# Patient Record
Sex: MALE | Race: WHITE | Employment: FULL TIME | URBAN - METROPOLITAN AREA
[De-identification: names, ages, dates, MRNs, and addresses within clinical notes are randomized per-mention and may not be internally consistent; named-entity substitution may affect disease eponyms.]

---

## 2018-10-03 ENCOUNTER — HOSPITAL ENCOUNTER (OUTPATIENT)
Age: 58
Setting detail: OUTPATIENT SURGERY
Discharge: HOME | End: 2018-10-03
Attending: UROLOGY | Admitting: UROLOGY
Payer: COMMERCIAL

## 2018-10-03 VITALS
DIASTOLIC BLOOD PRESSURE: 81 MMHG | RESPIRATION RATE: 16 BRPM | SYSTOLIC BLOOD PRESSURE: 163 MMHG | HEART RATE: 78 BPM | WEIGHT: 238.19 LBS | BODY MASS INDEX: 32.26 KG/M2 | TEMPERATURE: 96.9 F | OXYGEN SATURATION: 100 % | HEIGHT: 72 IN

## 2018-10-03 DIAGNOSIS — N13.2 URETERAL STONE WITH HYDRONEPHROSIS: Primary | ICD-10-CM

## 2018-10-03 PROBLEM — N20.1 LEFT URETERAL STONE: Status: ACTIVE | Noted: 2018-10-03

## 2018-10-03 PROCEDURE — 76210000020 HC REC RM PH II FIRST 0.5 HR: Performed by: UROLOGY

## 2018-10-03 PROCEDURE — 74011250636 HC RX REV CODE- 250/636: Performed by: UROLOGY

## 2018-10-03 PROCEDURE — 74011250636 HC RX REV CODE- 250/636

## 2018-10-03 PROCEDURE — 50590 FRAGMENTING OF KIDNEY STONE: CPT | Performed by: UROLOGY

## 2018-10-03 PROCEDURE — 77030020782 HC GWN BAIR PAWS FLX 3M -B: Performed by: UROLOGY

## 2018-10-03 RX ORDER — DOCUSATE SODIUM 100 MG/1
100 CAPSULE, LIQUID FILLED ORAL 2 TIMES DAILY
Qty: 30 CAP | Refills: 0 | Status: SHIPPED | OUTPATIENT
Start: 2018-10-03 | End: 2019-01-01

## 2018-10-03 RX ORDER — OXYCODONE AND ACETAMINOPHEN 5; 325 MG/1; MG/1
1 TABLET ORAL
Qty: 15 TAB | Refills: 0 | Status: SHIPPED | OUTPATIENT
Start: 2018-10-03

## 2018-10-03 RX ORDER — CEFAZOLIN SODIUM/WATER 2 G/20 ML
2 SYRINGE (ML) INTRAVENOUS ONCE
Status: DISCONTINUED | OUTPATIENT
Start: 2018-10-03 | End: 2018-10-03 | Stop reason: SDUPTHER

## 2018-10-03 RX ORDER — NALOXONE HYDROCHLORIDE 1 MG/ML
1 INJECTION INTRAMUSCULAR; INTRAVENOUS; SUBCUTANEOUS AS NEEDED
Status: DISCONTINUED | OUTPATIENT
Start: 2018-10-03 | End: 2018-10-04 | Stop reason: HOSPADM

## 2018-10-03 RX ORDER — SODIUM CHLORIDE, SODIUM LACTATE, POTASSIUM CHLORIDE, CALCIUM CHLORIDE 600; 310; 30; 20 MG/100ML; MG/100ML; MG/100ML; MG/100ML
125 INJECTION, SOLUTION INTRAVENOUS CONTINUOUS
Status: DISCONTINUED | OUTPATIENT
Start: 2018-10-03 | End: 2018-10-04 | Stop reason: HOSPADM

## 2018-10-03 RX ORDER — MIDAZOLAM HYDROCHLORIDE 5 MG/ML
6 INJECTION INTRAMUSCULAR; INTRAVENOUS AS NEEDED
Status: DISCONTINUED | OUTPATIENT
Start: 2018-10-03 | End: 2018-10-04 | Stop reason: HOSPADM

## 2018-10-03 RX ORDER — FLUMAZENIL 0.1 MG/ML
0.5 INJECTION INTRAVENOUS AS NEEDED
Status: DISCONTINUED | OUTPATIENT
Start: 2018-10-03 | End: 2018-10-04 | Stop reason: HOSPADM

## 2018-10-03 RX ORDER — OXYCODONE AND ACETAMINOPHEN 5; 325 MG/1; MG/1
1 TABLET ORAL
Status: CANCELLED | OUTPATIENT
Start: 2018-10-03

## 2018-10-03 RX ORDER — SODIUM CHLORIDE, SODIUM LACTATE, POTASSIUM CHLORIDE, CALCIUM CHLORIDE 600; 310; 30; 20 MG/100ML; MG/100ML; MG/100ML; MG/100ML
125 INJECTION, SOLUTION INTRAVENOUS CONTINUOUS
Status: CANCELLED | OUTPATIENT
Start: 2018-10-03

## 2018-10-03 RX ORDER — CEFAZOLIN SODIUM 2 G/50ML
2 SOLUTION INTRAVENOUS ONCE
Status: COMPLETED | OUTPATIENT
Start: 2018-10-03 | End: 2018-10-03

## 2018-10-03 RX ORDER — OXYCODONE AND ACETAMINOPHEN 5; 325 MG/1; MG/1
2 TABLET ORAL
Status: CANCELLED | OUTPATIENT
Start: 2018-10-03

## 2018-10-03 RX ORDER — FENTANYL CITRATE 50 UG/ML
300 INJECTION, SOLUTION INTRAMUSCULAR; INTRAVENOUS AS NEEDED
Status: DISCONTINUED | OUTPATIENT
Start: 2018-10-03 | End: 2018-10-04 | Stop reason: HOSPADM

## 2018-10-03 RX ADMIN — CEFAZOLIN SODIUM 2 G: 2 SOLUTION INTRAVENOUS at 16:36

## 2018-10-03 RX ADMIN — MIDAZOLAM HYDROCHLORIDE 2 MG: 5 INJECTION INTRAMUSCULAR; INTRAVENOUS at 16:41

## 2018-10-03 RX ADMIN — SODIUM CHLORIDE, SODIUM LACTATE, POTASSIUM CHLORIDE, AND CALCIUM CHLORIDE 125 ML/HR: 600; 310; 30; 20 INJECTION, SOLUTION INTRAVENOUS at 14:04

## 2018-10-03 RX ADMIN — FENTANYL CITRATE 100 MCG: 50 INJECTION, SOLUTION INTRAMUSCULAR; INTRAVENOUS at 16:50

## 2018-10-03 RX ADMIN — FENTANYL CITRATE 100 MCG: 50 INJECTION, SOLUTION INTRAMUSCULAR; INTRAVENOUS at 16:41

## 2018-10-03 NOTE — IP AVS SNAPSHOT
[address] [address] [place] [phone] Patient: Bernadine Conley MRN: [id number] :1960 About your hospitalization You were admitted on:  October 3, 2018 You last received care in the:  [address] [place] [address] You were discharged on:  October 3, 2018 Why you were hospitalized Your primary diagnosis was:  Not on File Your diagnoses also included:  Left Ureteral Stone Follow-up Information Follow up With Details Comments Contact Info Rui Javed MD   [address] 
[phone] Discharge Orders None A check casey indicates which time of day the medication should be taken. My Medications START taking these medications Instructions Each Dose to Equal  
 Morning Noon Evening Bedtime  
 docusate sodium 100 mg capsule Commonly known as:  Mehnaz Guidry Your last dose was: Your next dose is: Take 1 Cap by mouth two (2) times a day for 90 days. 100 mg  
    
   
   
   
  
 oxyCODONE-acetaminophen 5-325 mg per tablet Commonly known as:  PERCOCET Your last dose was: Your next dose is: Take 1 Tab by mouth every four (4) hours as needed for Pain. Max Daily Amount: 6 Tabs. 1 Tab CONTINUE taking these medications Instructions Each Dose to Equal  
 Morning Noon Evening Bedtime  
 aspirin delayed-release 81 mg tablet Your last dose was: Your next dose is: Take 81 mg by mouth daily. 81 mg  
    
   
   
   
  
 CIPRO 500 mg tablet Generic drug:  ciprofloxacin HCl Your last dose was: Your next dose is: Take 500 mg by mouth every twelve (12) hours. 500 mg  
    
   
   
   
  
 DILANTIN EXTENDED 100 mg ER capsule Generic drug:  phenytoin ER Your last dose was: Your next dose is: Take 200 mg by mouth two (2) times a day. 200 mg HYDROcodone-acetaminophen 5-325 mg per tablet Commonly known as:  [place] Your last dose was: Your next dose is: Take 1 Tab by mouth every four (4) hours as needed for Pain. 1 Tab MOTRIN  mg tablet Generic drug:  ibuprofen Your last dose was: Your next dose is: Take 800 mg by mouth every six (6) hours as needed for Pain. 800 mg  
    
   
   
   
  
 simvastatin 20 mg tablet Commonly known as:  ZOCOR Your last dose was: Your next dose is: Take 20 mg by mouth nightly. 20 mg Where to Get Your Medications Information on where to get these meds will be given to you by the nurse or doctor. ! Ask your nurse or doctor about these medications  
  docusate sodium 100 mg capsule  
 oxyCODONE-acetaminophen 5-325 mg per tablet Opioid Education Prescription Opioids: What You Need to Know: 
 
Prescription opioids can be used to help relieve moderate-to-severe pain and are often prescribed following a surgery or injury, or for certain health conditions. These medications can be an important part of treatment but also come with serious risks. Opioids are strong pain medicines. Examples include hydrocodone, oxycodone, fentanyl, and morphine. Heroin is an example of an illegal opioid. It is important to work with your health care provider to make sure you are getting the safest, most effective care. WHAT ARE THE RISKS AND SIDE EFFECTS OF OPIOID USE? Prescription opioids carry serious risks of addiction and overdose, especially with prolonged use. An opioid overdose, often marked by slow breathing, can cause sudden death. The use of prescription opioids can have a number of side effects as well, even when taken as directed. · Tolerance-meaning you might need to take more of a medication for the same pain relief · Physical dependence-meaning you have symptoms of withdrawal when the medication is stopped. Withdrawal symptoms can include nausea, sweating, chills, diarrhea, stomach cramps, and muscle aches. Withdrawal can last up to several weeks, depending on which drug you took and how long you took it. · Increased sensitivity to pain · Constipation · Nausea, vomiting, and dry mouth · Sleepiness and dizziness · Confusion · Depression · Low levels of testosterone that can result in lower sex drive, energy, and strength · Itching and sweating RISKS ARE GREATER WITH:      
· History of drug misuse, substance use disorder, or overdose · Mental health conditions (such as depression or anxiety) · Sleep apnea · Older age (72 years or older) · Pregnancy Avoid alcohol while taking prescription opioids. Also, unless specifically advised by your health care provider, medications to avoid include: · Benzodiazepines (such as Xanax or Valium) · Muscle relaxants (such as Soma or Flexeril) · Hypnotics (such as Ambien or Lunesta) · Other prescription opioids KNOW YOUR OPTIONS Talk to your health care provider about ways to manage your pain that don't involve prescription opioids. Some of these options may actually work better and have fewer risks and side effects. Consult your physician before adding or stopping any medications, treatments, or physical activity. Options may include: 
· Pain relievers such as acetaminophen, ibuprofen, and naproxen · Some medications that are also used for depression or seizures · Physical therapy and exercise · Counseling to help patients learn how to cope better with triggers of pain and stress. · Application of heat or cold compress · Massage therapy · Relaxation techniques Be Informed Make sure you know the name of your medication, how much and how often to take it, and its potential risks & side effects. IF YOU ARE PRESCRIBED OPIOIDS FOR PAIN: 
· Never take opioids in greater amounts or more often than prescribed. Remember the goal is not to be pain-free but to manage your pain at a tolerable level. · Follow up with your primary care provider to: · Work together to create a plan on how to manage your pain. · Talk about ways to help manage your pain that don't involve prescription opioids. · Talk about any and all concerns and side effects. · Help prevent misuse and abuse. · Never sell or share prescription opioids · Help prevent misuse and abuse. · Store prescription opioids in a secure place and out of reach of others (this may include visitors, children, friends, and family). · Safely dispose of unused/unwanted prescription opioids: Find your community drug take-back program or your pharmacy mail-back program, or flush them down the toilet, following guidance from the Food and Drug Administration (www.fda.gov/Drugs/ResourcesForYou). · Visit www.cdc.gov/drugoverdose to learn about the risks of opioid abuse and overdose. · If you believe you may be struggling with addiction, tell your health care provider and ask for guidance or call [address] [place] [address] at [phone]. Discharge Instructions PROCEDURE Left ESWL 
__ Notify [facility]. Urology IMMEDIATELY if any of the following occur: ? You are unable to urinate. Urgency to urinate is not uncommon. ? You find yourself urinating small frequent amounts associated with severe lower abdominal discomfort. ?  Bright red blood with clots in the urine. Some reddish urine is not uncommon and should be treated with increasing the amount of fluids you drink. ? Temperature above 101.5° and / or chills. ?  You are nauseous and / or vomiting and you cannot hold down any fluids. ?  Your pain is not controlled with the pain medication prescribed. Special Considerations:  
 
? Do not drive for at least 24 hours the procedure and until you are no longer taking narcotic pain medication and you are able to move and react without hesitation. ? You may return to work in 24 hours. ? _x_  No heavy lifting over 20 pounds & no strenuous exercise for 1 week ? _x_  Our office will call you to make an appointment in 2-3 weeks with kub Please contact [place] [address] Urology at [phone] or go to the nearest Emergency Department / Urgent Care facility for any other medical questions or concerns. Patient armband removed and shredded DISCHARGE SUMMARY from Nurse PATIENT INSTRUCTIONS: 
 
 
F-face looks uneven A-arms unable to move or move unevenly S-speech slurred or non-existent T-time-call 911 as soon as signs and symptoms begin-DO NOT go Back to bed or wait to see if you get better-TIME IS BRAIN. Warning Signs of HEART ATTACK Call 911 if you have these symptoms: 
? Chest discomfort. Most heart attacks involve discomfort in the center of the chest that lasts more than a few minutes, or that goes away and comes back. It can feel like uncomfortable pressure, squeezing, fullness, or pain. ? Discomfort in other areas of the upper body. Symptoms can include pain or discomfort in one or both arms, the back, neck, jaw, or stomach. ? Shortness of breath with or without chest discomfort. ? Other signs may include breaking out in a cold sweat, nausea, or lightheadedness. Don't wait more than five minutes to call [address]! Fast action can save your life.  Calling 911 is almost always the fastest way to get lifesaving treatment. Emergency Medical Services staff can begin treatment when they arrive  up to an hour sooner than if someone gets to the hospital by car. The discharge information has been reviewed with the patient and spouse. The patient and spouse verbalized understanding. Discharge medications reviewed with the patient and spouse and appropriate educational materials and side effects teaching were provided. ___________________________________________________________________________________________________________________________________ Introducing [facility]! [employer] introduces 3rdKind patient portal. Now you can access parts of your medical record, email your doctor's office, and request medication refills online. 1. In your internet browser, go to [email or web address] 2. Click on the First Time User? Click Here link in the Sign In box. You will see the New Member Sign Up page. 3. Enter your 3rdKind Access Code exactly as it appears below. You will not need to use this code after youve completed the sign-up process. If you do not sign up before the expiration date, you must request a new code. · 3rdKind Access Code: [email or web address] Expires: 12/31/2018  3:14 PM 
 
4. Enter the last four digits of your Social Security Number (xxxx) and Date of Birth (mm/dd/yyyy) as indicated and click Submit. You will be taken to the next sign-up page. 5. Create a [employer] ID. This will be your 3rdKind login ID and cannot be changed, so think of one that is secure and easy to remember. 6. Create a [employer] password. You can change your password at any time. 7. Enter your Password Reset Question and Answer. This can be used at a later time if you forget your password. 8. Enter your e-mail address. You will receive e-mail notification when new information is available in [address]. 9. Click Sign Up. You can now view and download portions of your medical record. 10. Click the Download Summary menu link to download a portable copy of your medical information. If you have questions, please visit the Frequently Asked Questions section of the [employer] website. Remember, [employer] is NOT to be used for urgent needs. For medical emergencies, dial 911. Now available from your iPhone and Android! Introducing Obinna Olsen As a [employer] patient, I wanted to make you aware of our electronic visit tool called Obinna Olsen. [employer] 24/7 allows you to connect within minutes with a medical provider 24 hours a day, seven days a week via a mobile device or tablet or logging into a secure website from your computer. You can access Obinna Olsen from anywhere in the [place]. A virtual visit might be right for you when you have a simple condition and feel like you just dont want to get out of bed, or cant get away from work for an appointment, when your regular [employer] provider is not available (evenings, weekends or holidays), or when youre out of town and need minor care. Electronic visits cost only $49 and if the [employer] 24/7 provider determines a prescription is needed to treat your condition, one can be electronically transmitted to a nearby pharmacy*. Please take a moment to enroll today if you have not already done so. The enrollment process is free and takes just a few minutes. To enroll, please download the [employer] 24/7 farhat to your tablet or phone, or visit [email or web address] to enroll on your computer. And, as an [address] [place] [address] patient with a [employer] account, the results of your visits will be scanned into your electronic medical record and your primary care provider will be able to view the scanned results. We urge you to continue to see your regular [employer] provider for your ongoing medical care.   And while your primary care provider may not be the one available when you seek a Obinna Olsen virtual visit, the peace of mind you get from getting a real diagnosis real time can be priceless. For more information on Obinna Olsen, view our Frequently Asked Questions (FAQs) at [email or web address]. Sincerely, 
 
Ronel Beebe MD 
Chief Medical Officer [employer] *:  certain medications cannot be prescribed via Obinna Olsen Providers Seen During Your Hospitalization Provider Specialty Primary office phone Valente Mcburney, MD Urology [phone] Your Primary Care Physician (PCP) Primary Care Physician Office Phone Office Fax Margie Ramossusana [phone] You are allergic to the following No active allergies Recent Documentation Height Weight BMI Smoking Status 1.829 m 108 kg 32.3 kg/m2 Former Smoker Emergency Contacts Name Discharge Info Relation Home Work Mobile Catina Maldonado CAREGIVER [3] Spouse [3]   [phone] Patient Belongings The following personal items are in your possession at time of discharge: 
  Dental Appliances: With patient  Visual Aid: Glasses, Sent home      Home Medications: None   Jewelry: Ring (x 1 with Odin Clemente)  Clothing: Socks, Footwear, Shirt, Undergarments, Shorts (locker #18)    Other Valuables: Eyeglasses (with Odin Clemente) Please provide this summary of care documentation to your next provider. Signatures-by signing, you are acknowledging that this After Visit Summary has been reviewed with you and you have received a copy. Patient Signature:  ____________________________________________________________ Date:  ____________________________________________________________  
  
Patel Bishop Provider Signature:  ____________________________________________________________ Date:  ____________________________________________________________

## 2018-10-03 NOTE — DISCHARGE INSTRUCTIONS
PROCEDURE     Left ESWL  __  [facility] Urology IMMEDIATELY if any of the following occur:     You are unable to urinate. Urgency to urinate is not uncommon.   You find yourself urinating small frequent amounts associated with severe lower abdominal discomfort.   Bright red blood with clots in the urine. Some reddish urine is not uncommon and should be treated with increasing the amount of fluids you drink.   Temperature above 101.5° and / or chills.   You are nauseous and / or vomiting and you cannot hold down any fluids.   Your pain is not controlled with the pain medication prescribed. Special Considerations:      Do not drive for at least 24 hours the procedure and until you are no longer taking narcotic pain medication and you are able to move and react without hesitation.  You may return to work in 24 hours.  _x_  No heavy lifting over 20 pounds & no strenuous exercise for 1 week      _x_  Our office will call you to make an appointment in 2-3 weeks with kub    Please contact [facility]. Urology at [phone] or go to the nearest Emergency Department / Urgent Care facility for any other medical questions or concerns. Patient armband removed and shredded    DISCHARGE SUMMARY from Nurse    PATIENT INSTRUCTIONS:    After general anesthesia or intravenous sedation, for 24 hours or while taking prescription Narcotics:  · Limit your activities  · Do not drive and operate hazardous machinery  · Do not make important personal or business decisions  · Do  not drink alcoholic beverages  · If you have not urinated within 8 hours after discharge, please contact your surgeon on call.     Report the following to your surgeon:  · Excessive pain, swelling, redness or odor of or around the surgical area  · Temperature over 100.5  · Nausea and vomiting lasting longer than 4 hours or if unable to take medications  · Any signs of decreased circulation or nerve impairment to extremity: change in color, persistent  numbness, tingling, coldness or increase pain  · Any questions    What to do at Home:  Recommended activity: Activity as tolerated and no driving for today and Ambulate in house,     If you experience any of the following symptoms above, please follow up with Dr. Loretha Cabot. *  Please give a list of your current medications to your Primary Care Provider. *  Please update this list whenever your medications are discontinued, doses are      changed, or new medications (including over-the-counter products) are added. *  Please carry medication information at all times in case of emergency situations. These are general instructions for a healthy lifestyle:    No smoking/ No tobacco products/ Avoid exposure to second hand smoke  Surgeon General's Warning:  Quitting smoking now greatly reduces serious risk to your health. Obesity, smoking, and sedentary lifestyle greatly increases your risk for illness    A healthy diet, regular physical exercise & weight monitoring are important for maintaining a healthy lifestyle    You may be retaining fluid if you have a history of heart failure or if you experience any of the following symptoms:  Weight gain of 3 pounds or more overnight or 5 pounds in a week, increased swelling in our hands or feet or shortness of breath while lying flat in bed. Please call your doctor as soon as you notice any of these symptoms; do not wait until your next office visit. Recognize signs and symptoms of STROKE:    F-face looks uneven    A-arms unable to move or move unevenly    S-speech slurred or non-existent    T-time-call 911 as soon as signs and symptoms begin-DO NOT go       Back to bed or wait to see if you get better-TIME IS BRAIN. Warning Signs of HEART ATTACK     Call 911 if you have these symptoms:   Chest discomfort. Most heart attacks involve discomfort in the center of the chest that lasts more than a few minutes, or that goes away and comes back.  It can feel like uncomfortable pressure, squeezing, fullness, or pain.  Discomfort in other areas of the upper body. Symptoms can include pain or discomfort in one or both arms, the back, neck, jaw, or stomach.  Shortness of breath with or without chest discomfort.  Other signs may include breaking out in a cold sweat, nausea, or lightheadedness. Don't wait more than five minutes to call 911 - MINUTES MATTER! Fast action can save your life. Calling 911 is almost always the fastest way to get lifesaving treatment. Emergency Medical Services staff can begin treatment when they arrive -- up to an hour sooner than if someone gets to the hospital by car. The discharge information has been reviewed with the patient and spouse. The patient and spouse verbalized understanding. Discharge medications reviewed with the patient and spouse and appropriate educational materials and side effects teaching were provided.   ___________________________________________________________________________________________________________________________________

## 2018-10-03 NOTE — H&P
H&P Update: 
Siddhartha Ortiz was seen and examined. History and physical has been reviewed. The patient has been examined.  There have been no significant clinical changes since the completion of the originally dated History and Physical. 
 
Signed By: Chance Nagy MD   
 October 3, 2018 2:53 PM

## 2018-10-03 NOTE — H&P
Urology [facility] [address]  [address] Tel: [phone] Fax: [phone] Patient: Sanjay Lopez YOB: 1960 Date: 10/01/2018 3:00 PM  
Visit Type: Consult This 63year old  patient was referred by [employer]. Completed Orders (this encounter) Order Interpretation Result Next Lab Date URINALYSIS NONAUTO W/O SCOPE see detail Test 1Color: yellow. Clarity: clear. Glucose: 50 mg/dl. Bilirubin: negative. Ketones: negative. Specific Gravity: 1.020. Blood: negative. pH: 5.0. Protein: trace. Urobilinogen: normal.  Nitrite: negative. Leukocytes: negative. Assessment/Plan # Detail Type Description 1. Assessment Calculus of kidney (N20.0). Patient Plan Left UVJ stone 5mm. Pt is symptomatic. He is on pain meds and cipro from the ER. Pt would like intervention. I discussed URS vs ESWL and the associated risks. He wishes to have left ESWL. The r/b/a were discussed in detail and all questions answered. HE would like to proceed. He understands there may be need for staged procedure treatment of the stone if the stone does not break well with ESWL. Pt will undergo PAT per hospital protocol. Plan Orders The patient had the following test(s) completed today: URINALYSIS NONAUTO W/O SCOPE. This 63year old male presents for Kidney and/or Ureteral Stone. History of Present Illness: 1. Kidney and/or Ureteral Renny Hoe It occurs intermittently. The problem is with no change. Location of pain is left flank. The pain does not radiate. Prior stone type of unknown. Pertinent history includes history of prior stone(s). There are no aggravating factors. Relieving factors include analgesics. Pertinent negatives include chills, constipation, diarrhea, dysuria, fever, hematuria, nausea, pain, urinary blood clots, dribbling (urinary), frequency (urinary), urinary straining and vomiting.  Additional information: [facility] 18 CT showed 5x4mm stone prox to left UVJ w/ hydro. Pain control with vicodin and motrin. pt is on cipro from ER. UA ph 6 rest neg. Geoffrey Quiet PAST MEDICAL/SURGICAL HISTORY   (Detailed) Disease/disorder Onset Date Management Date Comments Pulmonary nodule Hyperlipidemia      
seizure disorder Psoriasis Sleep apnea PROBLEM LIST:   Problem List reviewed. Problem Description Onset Date Chronic Clinical Status Notes Psoriasis 2017 N Hyperlipidemia 2017 N Seizure 2017 N Medications (active prior to today) Medication Instructions Start Date Stop Date Refilled Elsewhere  
clobetasol 0.05 % topical cream  2017   Y Dilantin Extended 100 mg capsule  2017   Y  
simvastatin 20 mg tablet  2017   Y  
triamcinolone acetonide 0.1 % topical cream  2017   Y Cipro 500 mg tablet take 1 tablet by oral route  every 12 hours //   Y  
hydrocodone 5 mg-acetaminophen 325 mg tablet take 1 tablet by oral route  every 6 hours as needed for pain //   Y Medication Reconciliation Medications reconciled today. Medication Reviewed Adherence Medication Name Sig Desc Elsewhere Status  
taking as directed clobetasol 0.05 % topical cream  Y Verified  
taking as directed simvastatin 20 mg tablet  Y Verified  
taking as directed Cipro 500 mg tablet take 1 tablet by oral route  every 12 hours Y Verified  
taking as directed hydrocodone 5 mg-acetaminophen 325 mg tablet take 1 tablet by oral route  every 6 hours as needed for pain Y Verified  
taking as directed triamcinolone acetonide 0.1 % topical cream  Y Verified  
taking as directed Dilantin Extended 100 mg capsule  Y Verified Allergies Ingredient Reaction (Severity) Medication Name Comment NO KNOWN ALLERGIES Family History  (Detailed) Relationship Family Member Name  Age at Death Condition Onset Age Cause of Death Father  Y 79 Cancer, lung  Y Mother  Y 80 Stroke  Y Sister    vitamin b def  N Sister    migranies  N Sister    Breast CA  N Social History:  (Detailed) Tobacco use reviewed. Preferred language is Declined to specify. MARITAL STATUS/FAMILY/SOCIAL SUPPORT Currently . Tobacco use status: Ex-cigarette smoker. Smoking status: Former smoker. SMOKING STATUS Use Status Type Smoking Status Usage Per Day Years Used Total Pack Years  
yes Cigarette Former smoker 2 Packs 34.00 68.00 ALCOHOL There is a history of alcohol use. Type: Beer. 1 beer consumed socially. CAFFEINE The patient uses caffeine: coffee - 3 cups a day. Review of Systems System Neg/Pos Details Constitutional Negative Chills, Fever and Pain. ENMT Negative Ear infections and Sore throat. Eyes Negative Blurred vision, Double vision and Eye pain. Respiratory Negative Asthma, Chronic cough, Dyspnea and Wheezing. Cardio Negative Chest pain. GI Negative Constipation, Decreased appetite, Diarrhea, Nausea and Vomiting.  Negative Dysuria, Hematuria, Urinary blood clots, Urinary dribbling, Urinary frequency and Urinary straining. Endocrine Negative Cold intolerance, Heat intolerance, Increased thirst and Weight loss. Neuro Negative Headache and Tremors. Psych Negative Anxiety and Depression. Integumentary Negative Itching skin and Rash. MS Negative Back pain and Joint pain. Hema/Lymph Negative Easy bleeding. Reproductive Negative Sexual dysfunction. Vital Signs Height Time ft in cm Last Measured Height Position 2:59 PM 6.0 0.50 184.15 10/01/2018 Standing Weight/BSA/BMI Time lb oz kg Context BMI kg/m2 BSA m2  
2:59 .00  104. 326 dressed with shoes 30.76 Blood Pressure Time BP mm/Hg Position Side Site Method Cuff Size 2:59 /93 sitting left  automatic adult Temperature/Pulse/Respiration Time Temp F Temp C Temp Site Pulse/min Pattern Resp/ min 2:59 PM 96.70 35.94  82 Measured By Time Measured by  
2:59 PM Kevin Bishop Physical Exam 
Exam Findings Details Constitutional Normal Well developed. Head/Face Normal Facial features - Normal.  
Nose/Mouth/Throat Normal Tongue - Normal. Buccal mucosa - Normal.  
Lymph Detail Normal Submandibular. Supraclavicular. Respiratory Normal Inspection - Normal. Effort - Normal.  
Abdomen Normal No abdominal tenderness. No hepatic enlargement. No hepatic enlargement. No spleen enlargement. No hernia. Genitourinary Normal Penis - Normal. Urethral meatus - Normal. Scrotum - Normal. Epididymides - Normal. Lymph nodes - Normal. Testes - Normal. No CVA tenderness. No suprapubic tenderness. Musculoskeletal Normal Visual overview of all four extremities is normal. Gait - Normal.  
Extremity Normal No edema. No Calf tenderness. Neurological Normal Level of consciousness - Normal. Orientation - Normal. Memory - Normal.  
Psychiatric Normal Orientation - Oriented to time, place, person & situation. Appropriate mood and affect. Patient Education # Patient Education 1. Kidney Stone: Care Instructions Medications (added, continued, or stopped today) Start Date Medication Directions PRN Status PRN Reason Instruction Stop Date Cipro 500 mg tablet take 1 tablet by oral route  every 12 hours N     
01/17/2017 clobetasol 0.05 % topical cream  N     
08/22/2017 Dilantin Extended 100 mg capsule  N     
 hydrocodone 5 mg-acetaminophen 325 mg tablet take 1 tablet by oral route  every 6 hours as needed for pain N     
08/16/2017 simvastatin 20 mg tablet  N     
01/17/2017 triamcinolone acetonide 0.1 % topical cream  N     
Urine Dipstick: 
Status Interpretation Result  
completed see detail Test 1Color: yellow. Clarity: clear. Glucose: 50 mg/dl. Bilirubin: negative. Ketones: negative. Specific Gravity: 1.020. Blood: negative. pH: 5.0. Protein: trace. Urobilinogen: normal.  Nitrite: negative. Leukocytes: negative. Active Patient Care Team Members Name Contact Agency Type Support Role Relationship Active Date Inactive Date Specialty Tiara Mayo   encounter provider    Pulmonary Medicine Adri Wick   encounter provider    Urology Arnold Seaman    Spouse Jevon Brothers   Patient provider PCP Provider:  
Noreen Ruggiero  10/02/2018 4:31 PM  
Document generated by:  Alicia Wick 10/02/2018 04:31 PM 
CC Providers: 
Jevon Brothers [address] [facility] 
[phone] Jevon Brothers [address] [facility] 
[phone]

## 2018-10-03 NOTE — PERIOP NOTES
Notified Dr Wick patient had aspirin last 10/1/18 and ibuprofen 800 mg po last 10/2/18. No orders given. Okay to proceed.

## 2018-10-03 NOTE — OP NOTES
Date of Procedure: 10/3/2018   Preoperative Diagnosis: LEFT URETERAL STONE  Postoperative Diagnosis: LEFT URETERAL STONE  Procedure(s):  LEFT URETERAL EXTRA CORPOREAL SHOCKWAVE LITHOTRIPSY (IV SEDATION)  Room 7  Surgeon(s) and Role:     * Adri Wick MD - Primary         Surgical Assistant: None    Surgical Staff:  Circ-1: Henry Zavaleta  Radiology Technician: RT Fuentes  No case tracking events are documented in the log. Anesthesia: Con-Sed   Estimated Blood Loss: zero  Specimens: * No specimens in log *   Findings: Left UVJ stone   Complications: None  Implants: * No implants in log *     Procedure Details: The patient was brought into the OR and was identified. Time out was called using two identifiers. He received conscious sedation using fentanyl and versed as needed by the nurse. The F1 and F2 focus points were used to locate the stone on the left UVJ. Once the stone was localized, the [employer] lithotripter machine was used for the procedure. The rate was 90 shocks per minute. Power was increased from 3 to 7. A total shock of 4000 was applied to the stone. At the completion, the stone looked to be fragmented well. The patient tolerated the procedure very well. He was around safely and transferred to the PACU in stable condition. Plan:  KUB in 2 weeks prior to office visit.

## 2018-10-03 NOTE — PERIOP NOTES
Patient's wife Rosario Hsu called [facility] concerning 's prescriptions for Percocet 5-325 mg Qty of 15 and Docusate 100 MG Qty of 30. Patient's prescriptions found in chart in preop area. Given to patient's wife

## 2018-10-03 NOTE — BRIEF OP NOTE
Date of Procedure: 10/3/2018 Preoperative Diagnosis: LEFT URETERAL STONE Postoperative Diagnosis: lLEFT URETERAL STONE Procedure(s): LEFT URETERAL EXTRA CORPOREAL SHOCKWAVE LITHOTRIPSY (IV SEDATION)  Room 7 Surgeon(s) and Role: * Adri Wick MD - Primary Surgical Assistant: None Surgical Staff: 
Circ-1: Balwinder [facility] Radiology Technician: RT Radha No case tracking events are documented in the log. Anesthesia: Con-Sed  
Estimated Blood Loss: zero Specimens: * No specimens in log * Findings: Left UVJ stone Complications: None Implants: * No implants in log * Procedure Details: The patient was brought into the OR and was identified. Time out was called using two identifiers. He received conscious sedation using fentanyl and versed as needed by the nurse. The F1 and F2 focus points were used to locate the stone on the left UVJ. Once the stone was localized, the [employer] lithotripter machine was used for the procedure. The rate was 90 shocks per minute. Power was increased from 3 to 7. A total shock of 4000 was applied to the stone. At the completion, the stone looked to be fragmented well. The patient tolerated the procedure very well. He was around safely and transferred to the PACU in stable condition. Plan: 
KUB in 2 weeks prior to office visit.

## 2020-08-16 ENCOUNTER — IP HISTORICAL/CONVERTED ENCOUNTER (OUTPATIENT)
Dept: OTHER | Age: 60
End: 2020-08-16

## 2022-03-18 PROBLEM — N20.1 LEFT URETERAL STONE: Status: ACTIVE | Noted: 2018-10-03

## 2023-05-22 RX ORDER — ASPIRIN 81 MG/1
81 TABLET ORAL DAILY
COMMUNITY

## 2023-05-22 RX ORDER — CIPROFLOXACIN 500 MG/1
500 TABLET, FILM COATED ORAL EVERY 12 HOURS
COMMUNITY

## 2023-05-22 RX ORDER — HYDROCODONE BITARTRATE AND ACETAMINOPHEN 5; 325 MG/1; MG/1
1 TABLET ORAL EVERY 4 HOURS PRN
COMMUNITY

## 2023-05-22 RX ORDER — IBUPROFEN 200 MG
800 TABLET ORAL EVERY 6 HOURS PRN
COMMUNITY

## 2023-05-22 RX ORDER — SIMVASTATIN 20 MG
20 TABLET ORAL NIGHTLY
COMMUNITY

## 2023-05-22 RX ORDER — OXYCODONE HYDROCHLORIDE AND ACETAMINOPHEN 5; 325 MG/1; MG/1
1 TABLET ORAL EVERY 4 HOURS PRN
COMMUNITY
Start: 2018-10-03

## 2023-05-22 RX ORDER — PHENYTOIN SODIUM 100 MG/1
200 CAPSULE, EXTENDED RELEASE ORAL 2 TIMES DAILY
COMMUNITY

## (undated) DEVICE — AIRLIFE™ NASAL OXYGEN CANNULA CURVED, FLARED TIP WITH 14 FOOT (4.3 M) CRUSH-RESISTANT TUBING, OVER-THE-EAR STYLE: Brand: AIRLIFE™